# Patient Record
Sex: FEMALE | Race: WHITE | NOT HISPANIC OR LATINO | Employment: OTHER | RURAL
[De-identification: names, ages, dates, MRNs, and addresses within clinical notes are randomized per-mention and may not be internally consistent; named-entity substitution may affect disease eponyms.]

---

## 2020-07-16 ENCOUNTER — HISTORICAL (OUTPATIENT)
Dept: ADMINISTRATIVE | Facility: HOSPITAL | Age: 85
End: 2020-07-16

## 2020-07-16 LAB
BASOPHILS # BLD AUTO: 0.04 X10E3/UL (ref 0–0.2)
BASOPHILS NFR BLD AUTO: 0.3 % (ref 0–1)
EOSINOPHIL # BLD AUTO: 0.22 X10E3/UL (ref 0–0.5)
EOSINOPHIL NFR BLD AUTO: 1.8 % (ref 1–4)
ERYTHROCYTE [DISTWIDTH] IN BLOOD BY AUTOMATED COUNT: 16.7 % (ref 11.5–14.5)
HCT VFR BLD AUTO: 36.7 % (ref 38–47)
HGB BLD-MCNC: 12.2 G/DL (ref 12–16)
LYMPHOCYTES # BLD AUTO: 3.47 X10E3/UL (ref 1–4.8)
LYMPHOCYTES NFR BLD AUTO: 29.1 % (ref 27–41)
MCH RBC QN AUTO: 29.7 PG (ref 27–31)
MCHC RBC AUTO-ENTMCNC: 33.2 G/DL (ref 32–36)
MCV RBC AUTO: 89 FL (ref 80–96)
MONOCYTES # BLD AUTO: 0.66 X10E3/UL (ref 0–0.8)
MONOCYTES NFR BLD AUTO: 5.5 % (ref 2–6)
MPC BLD CALC-MCNC: 9.7 FL (ref 9.4–12.4)
NEUTROPHILS # BLD AUTO: 7.53 X10E3/UL (ref 1.8–7.7)
NEUTROPHILS NFR BLD AUTO: 63.3 % (ref 53–65)
PLATELET # BLD AUTO: 322 X10E3/UL (ref 150–400)
RBC # BLD AUTO: 4.11 X10E6/UL (ref 4.2–5.4)
WBC # BLD AUTO: 11.92 X10E3/UL (ref 4.5–11)

## 2020-07-17 ENCOUNTER — HISTORICAL (OUTPATIENT)
Dept: ADMINISTRATIVE | Facility: HOSPITAL | Age: 85
End: 2020-07-17

## 2020-07-17 LAB
ALBUMIN SERPL BCP-MCNC: 3 G/DL (ref 3.4–5)
ALBUMIN/GLOB SERPL: 1 {RATIO}
ALP SERPL-CCNC: 56 U/L (ref 50–136)
ALT SERPL W P-5'-P-CCNC: 21 U/L (ref 12–78)
ANION GAP SERPL CALCULATED.3IONS-SCNC: 12 MMOL/L
AST SERPL W P-5'-P-CCNC: 20 U/L (ref 15–37)
BILIRUB SERPL-MCNC: 0.2 MG/DL (ref 0.2–1)
BILIRUB UR QL STRIP: NEGATIVE MG/DL
BUN SERPL-MCNC: 41 MG/DL (ref 7–18)
CALCIUM SERPL-MCNC: 9.7 MG/DL (ref 8.5–10.1)
CHLORIDE SERPL-SCNC: 101 MMOL/L (ref 101–111)
CK SERPL-CCNC: 34 U/L (ref 26–308)
CLARITY UR: CLEAR
CO2 SERPL-SCNC: 31 MMOL/L (ref 21–32)
COLOR UR: YELLOW
CREAT SERPL-MCNC: 1.6 MG/DL (ref 0.6–1.3)
GLOBULIN SER-MCNC: 4.5 G/DL
GLUCOSE SERPL-MCNC: 184 MG/DL (ref 74–106)
GLUCOSE UR STRIP-MCNC: NORMAL MG/DL
KETONES UR STRIP-SCNC: NEGATIVE MG/DL
LEUKOCYTE ESTERASE UR QL STRIP: NEGATIVE LEU/UL
NITRITE UR QL STRIP: NEGATIVE
NT-PROBNP SERPL-MCNC: 268 PG/ML (ref 5–450)
PH UR STRIP: 6 PH UNITS (ref 5–8)
POTASSIUM SERPL-SCNC: 3.8 MMOL/L (ref 3.6–5)
PROT SERPL-MCNC: 7.5 G/DL (ref 6.4–8.2)
PROT UR QL STRIP: NEGATIVE MG/DL
RBC # UR STRIP: ABNORMAL ERY/UL
SODIUM SERPL-SCNC: 140 MMOL/L (ref 135–145)
SP GR UR STRIP: 1.01 (ref 1–1.03)
TROPONIN I SERPL-MCNC: 0 NG/ML (ref 0–0.06)
TSH SERPL DL<=0.005 MIU/L-ACNC: 45.77 UIU/ML (ref 0.36–3.74)
UROBILINOGEN UR STRIP-ACNC: 0.2 MG/DL

## 2020-12-30 ENCOUNTER — HISTORICAL (OUTPATIENT)
Dept: ADMINISTRATIVE | Facility: HOSPITAL | Age: 85
End: 2020-12-30

## 2020-12-30 LAB
ALBUMIN SERPL BCP-MCNC: 2.6 G/DL (ref 3.4–5)
ALBUMIN/GLOB SERPL: 1 {RATIO}
ALP SERPL-CCNC: 77 U/L (ref 50–136)
ALT SERPL W P-5'-P-CCNC: 26 U/L (ref 12–78)
ANION GAP SERPL CALCULATED.3IONS-SCNC: 14 MMOL/L
AST SERPL W P-5'-P-CCNC: 29 U/L (ref 15–37)
BACTERIA #/AREA URNS HPF: ABNORMAL /HPF
BASOPHILS # BLD AUTO: 0.03 X10E3/UL (ref 0–0.2)
BASOPHILS NFR BLD AUTO: 0.2 % (ref 0–1)
BILIRUB SERPL-MCNC: 0.4 MG/DL (ref 0.2–1)
BILIRUB UR QL STRIP: NEGATIVE MG/DL
BUN SERPL-MCNC: 56 MG/DL (ref 7–18)
CALCIUM SERPL-MCNC: 9.2 MG/DL (ref 8.5–10.1)
CHLORIDE SERPL-SCNC: 91 MMOL/L (ref 101–111)
CLARITY UR: ABNORMAL
CLARITY UR: ABNORMAL
CO2 SERPL-SCNC: 27 MMOL/L (ref 21–32)
COLOR UR: YELLOW
COLOR UR: YELLOW
CREAT SERPL-MCNC: 3 MG/DL (ref 0.6–1.3)
EOSINOPHIL # BLD AUTO: 0.18 X10E3/UL (ref 0–0.5)
EOSINOPHIL NFR BLD AUTO: 1.3 % (ref 1–4)
ERYTHROCYTE [DISTWIDTH] IN BLOOD BY AUTOMATED COUNT: 16.5 % (ref 11.5–14.5)
GLOBULIN SER-MCNC: 4.8 G/DL
GLUCOSE SERPL-MCNC: 135 MG/DL (ref 74–106)
GLUCOSE SERPL-MCNC: 166 MG/DL (ref 70–105)
GLUCOSE UR STRIP-MCNC: NORMAL MG/DL
HCT VFR BLD AUTO: 33.9 % (ref 38–47)
HGB BLD-MCNC: 11.3 G/DL (ref 12–16)
KETONES UR STRIP-SCNC: NEGATIVE MG/DL
LACTATE SERPL-SCNC: 0.6 MMOL/L (ref 0.4–2)
LEUKOCYTE ESTERASE UR QL STRIP: ABNORMAL LEU/UL
LYMPHOCYTES # BLD AUTO: 3.83 X10E3/UL (ref 1–4.8)
LYMPHOCYTES NFR BLD AUTO: 26.6 % (ref 27–41)
MCH RBC QN AUTO: 29.4 PG (ref 27–31)
MCHC RBC AUTO-ENTMCNC: 33.3 G/DL (ref 32–36)
MCV RBC AUTO: 88 FL (ref 80–96)
MONOCYTES # BLD AUTO: 0.64 X10E3/UL (ref 0–0.8)
MONOCYTES NFR BLD AUTO: 4.4 % (ref 2–6)
MPC BLD CALC-MCNC: 9.8 FL (ref 9.4–12.4)
NEUTROPHILS # BLD AUTO: 9.72 X10E3/UL (ref 1.8–7.7)
NEUTROPHILS NFR BLD AUTO: 67.5 % (ref 53–65)
NITRITE UR QL STRIP: POSITIVE
PH UR STRIP: 6 PH UNITS (ref 5–8)
PLATELET # BLD AUTO: 310 X10E3/UL (ref 150–400)
POTASSIUM SERPL-SCNC: 5.5 MMOL/L (ref 3.6–5)
PROT SERPL-MCNC: 7.4 G/DL (ref 6.4–8.2)
PROT UR QL STRIP: 100 MG/DL
RBC # BLD AUTO: 3.85 X10E6/UL (ref 4.2–5.4)
RBC # UR STRIP: ABNORMAL ERY/UL
RBC #/AREA URNS HPF: ABNORMAL /HPF (ref 0–3)
SODIUM SERPL-SCNC: 126 MMOL/L (ref 135–145)
SP GR UR STRIP: 1.02 (ref 1–1.03)
SQUAMOUS #/AREA URNS LPF: ABNORMAL /LPF
UROBILINOGEN UR STRIP-ACNC: 0.2 MG/DL
WBC # BLD AUTO: 14.4 X10E3/UL (ref 4.5–11)
WBC #/AREA URNS HPF: ABNORMAL /HPF (ref 0–5)

## 2020-12-31 ENCOUNTER — HISTORICAL (OUTPATIENT)
Dept: ADMINISTRATIVE | Facility: HOSPITAL | Age: 85
End: 2020-12-31

## 2020-12-31 LAB
ANION GAP SERPL CALCULATED.3IONS-SCNC: 15 MMOL/L
BASOPHILS # BLD AUTO: 0.03 X10E3/UL (ref 0–0.2)
BASOPHILS NFR BLD AUTO: 0.2 % (ref 0–1)
BUN SERPL-MCNC: 53 MG/DL (ref 7–18)
CALCIUM SERPL-MCNC: 9.2 MG/DL (ref 8.5–10.1)
CHLORIDE SERPL-SCNC: 96 MMOL/L (ref 101–111)
CO2 SERPL-SCNC: 25 MMOL/L (ref 21–32)
CREAT SERPL-MCNC: 2.4 MG/DL (ref 0.6–1.3)
EOSINOPHIL # BLD AUTO: 0.06 X10E3/UL (ref 0–0.5)
EOSINOPHIL NFR BLD AUTO: 0.5 % (ref 1–4)
ERYTHROCYTE [DISTWIDTH] IN BLOOD BY AUTOMATED COUNT: 16.2 % (ref 11.5–14.5)
GLUCOSE SERPL-MCNC: 73 MG/DL (ref 70–105)
GLUCOSE SERPL-MCNC: 85 MG/DL (ref 70–105)
GLUCOSE SERPL-MCNC: 91 MG/DL (ref 74–106)
HCT VFR BLD AUTO: 32.1 % (ref 38–47)
HGB BLD-MCNC: 10.8 G/DL (ref 12–16)
LYMPHOCYTES # BLD AUTO: 3.65 X10E3/UL (ref 1–4.8)
LYMPHOCYTES NFR BLD AUTO: 29.1 % (ref 27–41)
MCH RBC QN AUTO: 29.5 PG (ref 27–31)
MCHC RBC AUTO-ENTMCNC: 33.6 G/DL (ref 32–36)
MCV RBC AUTO: 88 FL (ref 80–96)
MONOCYTES # BLD AUTO: 0.48 X10E3/UL (ref 0–0.8)
MONOCYTES NFR BLD AUTO: 3.8 % (ref 2–6)
MPC BLD CALC-MCNC: 10 FL (ref 9.4–12.4)
NEUTROPHILS # BLD AUTO: 8.33 X10E3/UL (ref 1.8–7.7)
NEUTROPHILS NFR BLD AUTO: 66.4 % (ref 53–65)
PLATELET # BLD AUTO: 318 X10E3/UL (ref 150–400)
POTASSIUM SERPL-SCNC: 4.6 MMOL/L (ref 3.6–5)
RBC # BLD AUTO: 3.66 X10E6/UL (ref 4.2–5.4)
SARS-COV+SARS-COV-2 AG RESP QL IA.RAPID: NEGATIVE
SODIUM SERPL-SCNC: 131 MMOL/L (ref 135–145)
WBC # BLD AUTO: 12.55 X10E3/UL (ref 4.5–11)

## 2021-01-01 ENCOUNTER — TELEPHONE (OUTPATIENT)
Dept: FAMILY MEDICINE | Facility: CLINIC | Age: 86
End: 2021-01-01

## 2021-01-01 ENCOUNTER — LAB REQUISITION (OUTPATIENT)
Dept: LAB | Facility: HOSPITAL | Age: 86
End: 2021-01-01
Attending: FAMILY MEDICINE
Payer: MEDICARE

## 2021-01-01 ENCOUNTER — OFFICE VISIT (OUTPATIENT)
Dept: FAMILY MEDICINE | Facility: CLINIC | Age: 86
End: 2021-01-01
Payer: MEDICARE

## 2021-01-01 VITALS — DIASTOLIC BLOOD PRESSURE: 80 MMHG | HEART RATE: 82 BPM | SYSTOLIC BLOOD PRESSURE: 126 MMHG

## 2021-01-01 DIAGNOSIS — J30.2 SEASONAL ALLERGIC RHINITIS, UNSPECIFIED TRIGGER: ICD-10-CM

## 2021-01-01 DIAGNOSIS — G25.81 RESTLESS LEGS: ICD-10-CM

## 2021-01-01 DIAGNOSIS — E11.51 TYPE 2 DIABETES MELLITUS WITH DIABETIC PERIPHERAL ANGIOPATHY WITHOUT GANGRENE, WITHOUT LONG-TERM CURRENT USE OF INSULIN: ICD-10-CM

## 2021-01-01 DIAGNOSIS — J44.9 CHRONIC OBSTRUCTIVE PULMONARY DISEASE, UNSPECIFIED COPD TYPE: Primary | ICD-10-CM

## 2021-01-01 DIAGNOSIS — I50.9 CONGESTIVE HEART FAILURE, UNSPECIFIED HF CHRONICITY, UNSPECIFIED HEART FAILURE TYPE: ICD-10-CM

## 2021-01-01 DIAGNOSIS — E03.9 HYPOTHYROIDISM (ACQUIRED): ICD-10-CM

## 2021-01-01 DIAGNOSIS — E11.9 TYPE 2 DIABETES MELLITUS WITHOUT COMPLICATION, WITHOUT LONG-TERM CURRENT USE OF INSULIN: ICD-10-CM

## 2021-01-01 DIAGNOSIS — I10 HYPERTENSION, UNSPECIFIED TYPE: Primary | ICD-10-CM

## 2021-01-01 DIAGNOSIS — N30.00 ACUTE CYSTITIS WITHOUT HEMATURIA: Primary | ICD-10-CM

## 2021-01-01 DIAGNOSIS — N18.9 CHRONIC KIDNEY DISEASE, UNSPECIFIED: ICD-10-CM

## 2021-01-01 DIAGNOSIS — N39.0 URINARY TRACT INFECTION, SITE NOT SPECIFIED: ICD-10-CM

## 2021-01-01 DIAGNOSIS — M10.9 GOUT, UNSPECIFIED CAUSE, UNSPECIFIED CHRONICITY, UNSPECIFIED SITE: Primary | ICD-10-CM

## 2021-01-01 DIAGNOSIS — M81.0 AGE-RELATED OSTEOPOROSIS WITHOUT CURRENT PATHOLOGICAL FRACTURE: Primary | ICD-10-CM

## 2021-01-01 DIAGNOSIS — I50.9 CONGESTIVE HEART FAILURE, UNSPECIFIED HF CHRONICITY, UNSPECIFIED HEART FAILURE TYPE: Primary | ICD-10-CM

## 2021-01-01 DIAGNOSIS — K21.9 GASTROESOPHAGEAL REFLUX DISEASE, UNSPECIFIED WHETHER ESOPHAGITIS PRESENT: ICD-10-CM

## 2021-01-01 DIAGNOSIS — N39.0 URINARY TRACT INFECTION WITHOUT HEMATURIA, SITE UNSPECIFIED: Primary | ICD-10-CM

## 2021-01-01 DIAGNOSIS — M81.0 OSTEOPOROSIS, UNSPECIFIED OSTEOPOROSIS TYPE, UNSPECIFIED PATHOLOGICAL FRACTURE PRESENCE: Primary | ICD-10-CM

## 2021-01-01 LAB
BACTERIA #/AREA URNS HPF: ABNORMAL /HPF
BILIRUB UR QL STRIP: NEGATIVE
CLARITY UR: CLEAR
COLOR UR: YELLOW
GLUCOSE UR STRIP-MCNC: NEGATIVE MG/DL
KETONES UR STRIP-SCNC: NEGATIVE MG/DL
LEUKOCYTE ESTERASE UR QL STRIP: ABNORMAL
NITRITE UR QL STRIP: NEGATIVE
PH UR STRIP: 6 PH UNITS
PROT UR QL STRIP: NEGATIVE
RBC # UR STRIP: NEGATIVE /UL
RBC #/AREA URNS HPF: ABNORMAL /HPF
SP GR UR STRIP: 1.01
SQUAMOUS #/AREA URNS LPF: ABNORMAL /LPF
UA COMPLETE W REFLEX CULTURE PNL UR: ABNORMAL
UROBILINOGEN UR STRIP-ACNC: 0.2 MG/DL
WBC #/AREA URNS HPF: ABNORMAL /HPF

## 2021-01-01 PROCEDURE — 81001 URINALYSIS AUTO W/SCOPE: CPT | Performed by: FAMILY MEDICINE

## 2021-01-01 PROCEDURE — 87077 CULTURE AEROBIC IDENTIFY: CPT | Performed by: FAMILY MEDICINE

## 2021-01-01 PROCEDURE — 87086 URINE CULTURE/COLONY COUNT: CPT | Performed by: FAMILY MEDICINE

## 2021-01-01 PROCEDURE — 99214 OFFICE O/P EST MOD 30 MIN: CPT | Mod: 95,,, | Performed by: FAMILY MEDICINE

## 2021-01-01 PROCEDURE — 99214 PR OFFICE/OUTPT VISIT, EST, LEVL IV, 30-39 MIN: ICD-10-PCS | Mod: 95,,, | Performed by: FAMILY MEDICINE

## 2021-01-01 PROCEDURE — 81003 URINALYSIS AUTO W/O SCOPE: CPT | Performed by: FAMILY MEDICINE

## 2021-01-01 RX ORDER — ALLOPURINOL 100 MG/1
100 TABLET ORAL 2 TIMES DAILY
COMMUNITY
Start: 2021-01-01 | End: 2021-01-01 | Stop reason: SDUPTHER

## 2021-01-01 RX ORDER — GABAPENTIN 400 MG/1
400 CAPSULE ORAL NIGHTLY
Qty: 90 CAPSULE | Refills: 1 | Status: SHIPPED | OUTPATIENT
Start: 2021-01-01 | End: 2021-01-01

## 2021-01-01 RX ORDER — NYSTATIN 100000 U/G
CREAM TOPICAL
COMMUNITY
Start: 2020-12-22

## 2021-01-01 RX ORDER — ALBUTEROL SULFATE 0.63 MG/3ML
0.63 SOLUTION RESPIRATORY (INHALATION) EVERY 4 HOURS PRN
Qty: 75 ML | Refills: 2 | Status: SHIPPED | OUTPATIENT
Start: 2021-01-01

## 2021-01-01 RX ORDER — ALLOPURINOL 100 MG/1
100 TABLET ORAL 2 TIMES DAILY
Qty: 180 TABLET | Refills: 1 | Status: SHIPPED | OUTPATIENT
Start: 2021-01-01

## 2021-01-01 RX ORDER — ALBUTEROL SULFATE 0.63 MG/3ML
1 SOLUTION RESPIRATORY (INHALATION) 4 TIMES DAILY PRN
COMMUNITY
Start: 2021-01-01 | End: 2021-01-01 | Stop reason: SDUPTHER

## 2021-01-01 RX ORDER — LEVOTHYROXINE SODIUM 100 UG/1
100 TABLET ORAL
COMMUNITY
Start: 2021-01-01 | End: 2021-01-01 | Stop reason: SDUPTHER

## 2021-01-01 RX ORDER — ALENDRONATE SODIUM 70 MG/1
70 TABLET ORAL WEEKLY
Qty: 12 TABLET | Refills: 1 | Status: SHIPPED | OUTPATIENT
Start: 2021-01-01

## 2021-01-01 RX ORDER — ASPIRIN 325 MG
325 TABLET ORAL DAILY
COMMUNITY

## 2021-01-01 RX ORDER — ALBUTEROL SULFATE 0.63 MG/3ML
0.63 SOLUTION RESPIRATORY (INHALATION) EVERY 4 HOURS PRN
COMMUNITY
End: 2021-01-01 | Stop reason: SDUPTHER

## 2021-01-01 RX ORDER — GABAPENTIN 400 MG/1
1 CAPSULE ORAL NIGHTLY
COMMUNITY
Start: 2021-01-01 | End: 2021-01-01 | Stop reason: SDUPTHER

## 2021-01-01 RX ORDER — NITROFURANTOIN 25; 75 MG/1; MG/1
100 CAPSULE ORAL 2 TIMES DAILY
Qty: 20 CAPSULE | Refills: 0 | Status: SHIPPED | OUTPATIENT
Start: 2021-01-01 | End: 2021-01-01

## 2021-01-01 RX ORDER — ESOMEPRAZOLE MAGNESIUM 40 MG/1
40 CAPSULE, DELAYED RELEASE ORAL DAILY
COMMUNITY
Start: 2021-01-01 | End: 2021-01-01 | Stop reason: SDUPTHER

## 2021-01-01 RX ORDER — DIAPER,BRIEF,ADULT, DISPOSABLE
EACH MISCELLANEOUS DAILY
COMMUNITY

## 2021-01-01 RX ORDER — ALBUTEROL SULFATE 0.63 MG/3ML
1 SOLUTION RESPIRATORY (INHALATION) 4 TIMES DAILY PRN
Qty: 1 BOX | Refills: 5 | Status: SHIPPED | OUTPATIENT
Start: 2021-01-01 | End: 2021-01-01 | Stop reason: SDUPTHER

## 2021-01-01 RX ORDER — FUROSEMIDE 40 MG/1
40 TABLET ORAL 2 TIMES DAILY
COMMUNITY
End: 2021-01-01 | Stop reason: SDUPTHER

## 2021-01-01 RX ORDER — LISINOPRIL 10 MG/1
10 TABLET ORAL DAILY
COMMUNITY
Start: 2021-01-01 | End: 2021-01-01 | Stop reason: SDUPTHER

## 2021-01-01 RX ORDER — FERROUS SULFATE, DRIED 160(50) MG
1 TABLET, EXTENDED RELEASE ORAL 2 TIMES DAILY WITH MEALS
COMMUNITY

## 2021-01-01 RX ORDER — LISINOPRIL 10 MG/1
10 TABLET ORAL DAILY
Qty: 90 TABLET | Refills: 1 | Status: SHIPPED | OUTPATIENT
Start: 2021-01-01 | End: 2021-01-01

## 2021-01-01 RX ORDER — ESOMEPRAZOLE MAGNESIUM 40 MG/1
40 CAPSULE, DELAYED RELEASE ORAL DAILY
Qty: 90 CAPSULE | Refills: 1 | Status: SHIPPED | OUTPATIENT
Start: 2021-01-01 | End: 2021-01-01

## 2021-01-01 RX ORDER — CARVEDILOL PHOSPHATE 40 MG/1
40 CAPSULE, EXTENDED RELEASE ORAL DAILY
Qty: 90 CAPSULE | Refills: 1 | Status: SHIPPED | OUTPATIENT
Start: 2021-01-01 | End: 2021-01-01

## 2021-01-01 RX ORDER — MONTELUKAST SODIUM 10 MG/1
10 TABLET ORAL NIGHTLY
Qty: 90 TABLET | Refills: 1 | Status: SHIPPED | OUTPATIENT
Start: 2021-01-01 | End: 2021-01-01

## 2021-01-01 RX ORDER — ALBUTEROL SULFATE 0.63 MG/3ML
SOLUTION RESPIRATORY (INHALATION)
Qty: 300 EACH | Refills: 3 | Status: SHIPPED | OUTPATIENT
Start: 2021-01-01 | End: 2021-01-01

## 2021-01-01 RX ORDER — ALENDRONATE SODIUM 70 MG/1
70 TABLET ORAL WEEKLY
Qty: 12 TABLET | Refills: 1 | Status: CANCELLED | OUTPATIENT
Start: 2021-01-01

## 2021-01-01 RX ORDER — CARVEDILOL PHOSPHATE 40 MG/1
40 CAPSULE, EXTENDED RELEASE ORAL DAILY
COMMUNITY
Start: 2021-01-01 | End: 2021-01-01 | Stop reason: SDUPTHER

## 2021-01-01 RX ORDER — LEVOFLOXACIN 500 MG/1
500 TABLET, FILM COATED ORAL DAILY
Qty: 20 TABLET | Refills: 0 | Status: SHIPPED | OUTPATIENT
Start: 2021-01-01

## 2021-01-01 RX ORDER — FUROSEMIDE 40 MG/1
40 TABLET ORAL 2 TIMES DAILY
Qty: 180 TABLET | Refills: 1 | Status: SHIPPED | OUTPATIENT
Start: 2021-01-01 | End: 2021-01-01

## 2021-01-01 RX ORDER — ALENDRONATE SODIUM 70 MG/1
70 TABLET ORAL WEEKLY
COMMUNITY
Start: 2021-01-01 | End: 2021-01-01 | Stop reason: SDUPTHER

## 2021-01-01 RX ORDER — MONTELUKAST SODIUM 10 MG/1
10 TABLET ORAL NIGHTLY
COMMUNITY
Start: 2021-01-01 | End: 2021-01-01 | Stop reason: SDUPTHER

## 2021-01-01 RX ORDER — CALCIUM CARBONATE/VITAMIN D3 600MG-5MCG
1 TABLET ORAL DAILY
COMMUNITY

## 2021-01-01 RX ORDER — LEVALBUTEROL INHALATION SOLUTION 0.31 MG/3ML
1 SOLUTION RESPIRATORY (INHALATION) 3 TIMES DAILY PRN
Qty: 120 ML | Refills: 5 | Status: SHIPPED | OUTPATIENT
Start: 2021-01-01 | End: 2022-01-01

## 2021-01-01 RX ORDER — GLIPIZIDE 10 MG/1
0.5 TABLET ORAL 2 TIMES DAILY WITH MEALS
COMMUNITY
Start: 2021-01-01 | End: 2021-01-01 | Stop reason: SDUPTHER

## 2021-01-01 RX ORDER — FLUCONAZOLE 100 MG/1
100 TABLET ORAL DAILY
Qty: 10 TABLET | Refills: 0 | Status: SHIPPED | OUTPATIENT
Start: 2021-01-01 | End: 2021-01-01

## 2021-01-01 RX ORDER — LEVOTHYROXINE SODIUM 100 UG/1
100 TABLET ORAL
Qty: 90 TABLET | Refills: 1 | Status: SHIPPED | OUTPATIENT
Start: 2021-01-01 | End: 2021-01-01

## 2021-01-01 RX ORDER — ALBUTEROL SULFATE 0.63 MG/3ML
SOLUTION RESPIRATORY (INHALATION)
Qty: 300 EACH | Refills: 3 | Status: SHIPPED | OUTPATIENT
Start: 2021-01-01 | End: 2021-01-01 | Stop reason: SDUPTHER

## 2021-01-01 RX ORDER — GLIPIZIDE 10 MG/1
5 TABLET ORAL 2 TIMES DAILY WITH MEALS
Qty: 180 TABLET | Refills: 1 | Status: SHIPPED | OUTPATIENT
Start: 2021-01-01

## 2021-01-02 LAB
REPORT: NORMAL

## 2021-01-06 LAB
REPORT: NORMAL

## 2021-07-12 PROBLEM — I10 HYPERTENSION: Status: ACTIVE | Noted: 2021-01-01

## 2021-07-12 PROBLEM — E11.9 TYPE 2 DIABETES MELLITUS: Status: ACTIVE | Noted: 2018-10-01

## 2021-07-12 PROBLEM — K21.9 GASTROESOPHAGEAL REFLUX DISEASE: Status: ACTIVE | Noted: 2021-01-01

## 2021-07-12 PROBLEM — I50.9 CONGESTIVE HEART FAILURE: Status: ACTIVE | Noted: 2021-01-01

## 2021-07-12 PROBLEM — G25.81 RESTLESS LEGS: Status: ACTIVE | Noted: 2021-01-01

## 2021-07-12 PROBLEM — J30.2 SEASONAL ALLERGIC RHINITIS: Status: ACTIVE | Noted: 2021-01-01

## 2022-01-01 ENCOUNTER — TELEPHONE (OUTPATIENT)
Dept: FAMILY MEDICINE | Facility: CLINIC | Age: 87
End: 2022-01-01
Payer: MEDICARE

## 2022-01-01 DIAGNOSIS — N76.0 ACUTE VAGINITIS: Primary | ICD-10-CM

## 2022-01-01 RX ORDER — ALBUTEROL SULFATE 90 UG/1
2 AEROSOL, METERED RESPIRATORY (INHALATION) EVERY 6 HOURS PRN
COMMUNITY
Start: 2022-01-01

## 2022-01-01 RX ORDER — ASCORBIC ACID 500 MG
1000 TABLET ORAL 2 TIMES DAILY
COMMUNITY
Start: 2022-01-01

## 2022-01-01 RX ORDER — ZINC GLUCONATE 50 MG
1 TABLET ORAL DAILY
COMMUNITY
Start: 2022-01-01

## 2022-01-01 RX ORDER — FLUCONAZOLE 100 MG/1
100 TABLET ORAL DAILY
Qty: 5 TABLET | Refills: 0 | Status: SHIPPED | OUTPATIENT
Start: 2022-01-01

## 2022-01-01 RX ORDER — FAMOTIDINE 20 MG/1
1 TABLET, FILM COATED ORAL 2 TIMES DAILY
COMMUNITY
Start: 2022-01-01

## 2022-01-01 RX ORDER — DEXAMETHASONE 6 MG/1
1 TABLET ORAL DAILY
COMMUNITY
Start: 2022-01-01 | End: 2022-01-01

## 2022-01-01 RX ORDER — CEFUROXIME AXETIL 500 MG/1
1 TABLET ORAL EVERY 12 HOURS
COMMUNITY
Start: 2022-01-01

## 2022-01-25 NOTE — TELEPHONE ENCOUNTER
Spoke with pt's daughter for [facility] call. She reports pt is still disoriented and confused. She reports decreased po intake but was able to get her to take some broth today. She reports pt is wearing oxygen and not having trouble breathing. She vu of isolation. Daughter reports pt is bedbound and incont, family care for pt. She is current with HH and has needed DME. She vu of f/u with Dr Jarvis, states he makes home visit to pt. Meds reviewed. She vu of meds. Instructed to call HH or dr for new or worsening symptoms, questions or concerns.   Contacted Dr Jarvis office to verify home visit. Spoke with Zayra. She stated Dr Jarvis was not in the clinic and call back tomorrow to verify this.

## 2022-01-27 NOTE — TELEPHONE ENCOUNTER
Zayra from Dr Jarvis office returned call and stated he will make home visit on 2/1/22 at 1000. Daughter was notified by clinic.

## 2022-02-08 NOTE — TELEPHONE ENCOUNTER
Home health sent note that patient has been on antibiotics and has severe redness and itching to vaginal area. Requesting diflucan.